# Patient Record
Sex: MALE | Race: WHITE | NOT HISPANIC OR LATINO | URBAN - METROPOLITAN AREA
[De-identification: names, ages, dates, MRNs, and addresses within clinical notes are randomized per-mention and may not be internally consistent; named-entity substitution may affect disease eponyms.]

---

## 2018-06-06 ENCOUNTER — OFFICE VISIT (OUTPATIENT)
Dept: URBAN - METROPOLITAN AREA CLINIC 193 | Facility: CLINIC | Age: 73
End: 2018-06-06
Payer: COMMERCIAL

## 2018-06-06 PROCEDURE — 99213 OFFICE O/P EST LOW 20 MIN: CPT | Performed by: OPHTHALMOLOGY

## 2018-06-06 PROCEDURE — 92134 CPTRZ OPH DX IMG PST SGM RTA: CPT | Performed by: OPHTHALMOLOGY

## 2018-06-06 RX ORDER — BROMFENAC 1.03 MG/ML
0.09 % SOLUTION/ DROPS OPHTHALMIC
Qty: 1 | Refills: 1 | Status: INACTIVE
Start: 2018-06-06 | End: 2018-06-20

## 2018-06-06 RX ORDER — PREDNISOLONE ACETATE 10 MG/ML
1 % SUSPENSION/ DROPS OPHTHALMIC
Qty: 1 | Refills: 1 | Status: INACTIVE
Start: 2018-06-06 | End: 2019-08-22

## 2018-06-06 ASSESSMENT — INTRAOCULAR PRESSURE
OS: 14
OD: 13

## 2018-06-06 NOTE — IMPRESSION/PLAN
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 OS.
Mac-OCT OD stable, OS showing macular thicking Plan: Discussed diagnosis in detail with patient. recommend starting Pred Forte BID OS, Bromfenac BID OS. if Bromfenac not covered, will need to switch to covered drop.

## 2018-06-20 ENCOUNTER — OFFICE VISIT (OUTPATIENT)
Dept: URBAN - METROPOLITAN AREA CLINIC 193 | Facility: CLINIC | Age: 73
End: 2018-06-20
Payer: COMMERCIAL

## 2018-06-20 PROCEDURE — 99024 POSTOP FOLLOW-UP VISIT: CPT | Performed by: OPHTHALMOLOGY

## 2018-06-20 RX ORDER — KETOROLAC TROMETHAMINE 5 MG/ML
0.5 % SOLUTION OPHTHALMIC
Qty: 1 | Refills: 1 | Status: INACTIVE
Start: 2018-06-20 | End: 2019-08-22

## 2018-06-20 ASSESSMENT — INTRAOCULAR PRESSURE
OD: 13
OS: 13

## 2018-06-20 NOTE — IMPRESSION/PLAN
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 OS.
Mac-OCT- OD stable, OS slight improvement Plan: Continue Pred Forte BID OS, Ketorolac BID OS. recommend patient get copy of MRx for [facility] and see Dr Pastor Leija for MRx and compare.

## 2018-07-02 ENCOUNTER — TESTING ONLY (OUTPATIENT)
Dept: URBAN - METROPOLITAN AREA CLINIC 193 | Facility: CLINIC | Age: 73
End: 2018-07-02
Payer: COMMERCIAL

## 2018-07-02 DIAGNOSIS — H52.223 REGULAR ASTIGMATISM, BILATERAL: Primary | ICD-10-CM

## 2018-07-02 DIAGNOSIS — H59.032 CYSTOID MACULAR EDEMA FOLLOWING CATARACT SURGERY OF LEFT EYE: ICD-10-CM

## 2018-07-02 ASSESSMENT — INTRAOCULAR PRESSURE
OD: 14
OS: 17

## 2018-07-02 ASSESSMENT — VISUAL ACUITY
OD: 20/25-
OS: 20/30+

## 2018-07-02 NOTE — IMPRESSION/PLAN
Impression: Regular astigmatism, bilateral: H52.223. significant change OS compared to Rx from [facility] Plan: A glasses prescription has been discussed and generated. Patient to call with any concerns.

## 2018-07-02 NOTE — IMPRESSION/PLAN
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 OS.
Mac-OCT- OD stable, OS slight improvement Plan: Stop Pred Forte BID OS due to possible steroid response. Continue Ketorolac QID OS.

## 2018-10-05 ENCOUNTER — OFFICE VISIT (OUTPATIENT)
Dept: URBAN - METROPOLITAN AREA CLINIC 193 | Facility: CLINIC | Age: 73
End: 2018-10-05
Payer: COMMERCIAL

## 2018-10-05 DIAGNOSIS — H04.123 DRY EYE SYNDROME OF BILATERAL LACRIMAL GLANDS: ICD-10-CM

## 2018-10-05 DIAGNOSIS — Z96.1 PRESENCE OF INTRAOCULAR LENS: ICD-10-CM

## 2018-10-05 DIAGNOSIS — E11.9 TYPE 2 DIABETES MELLITUS W/O COMPLICATION: ICD-10-CM

## 2018-10-05 PROCEDURE — 92014 COMPRE OPH EXAM EST PT 1/>: CPT | Performed by: OPTOMETRIST

## 2018-10-05 PROCEDURE — 92134 CPTRZ OPH DX IMG PST SGM RTA: CPT | Performed by: OPTOMETRIST

## 2018-10-05 ASSESSMENT — INTRAOCULAR PRESSURE
OS: 13
OD: 13

## 2018-10-05 NOTE — IMPRESSION/PLAN
Impression: Type 2 diabetes mellitus w/o complication: N15.6. Plan: DM: No diabetic retinopathy. Discussed ocular and systemic benefits of blood sugar control. Patient was instructed to monitor vision for changes and to call if noted.

## 2018-10-05 NOTE — IMPRESSION/PLAN
Impression: Regular astigmatism, bilateral: H52.223. Plan: A glasses prescription has been discussed and generated. Patient to call with any concerns. OS similar to 7/2018 SRx.

## 2018-10-05 NOTE — IMPRESSION/PLAN
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 OS.
Mac-OCT- OD stable, OS still slight improvement. Plan: no further treatment needed. Continue to monitor.

## 2018-10-05 NOTE — IMPRESSION/PLAN
Impression: Dry eye syndrome of bilateral lacrimal glands: H04.123. Plan: Continue ATs. Use before symptomatic.

## 2019-08-22 ENCOUNTER — OFFICE VISIT (OUTPATIENT)
Dept: URBAN - METROPOLITAN AREA CLINIC 71 | Facility: CLINIC | Age: 74
End: 2019-08-22
Payer: COMMERCIAL

## 2019-08-22 PROCEDURE — 92014 COMPRE OPH EXAM EST PT 1/>: CPT | Performed by: OPTOMETRIST

## 2019-08-22 ASSESSMENT — VISUAL ACUITY
OD: 20/40
OS: 20/30+

## 2019-08-22 ASSESSMENT — INTRAOCULAR PRESSURE
OS: 12
OD: 10

## 2019-08-22 NOTE — IMPRESSION/PLAN
Impression: Type 2 diabetes mellitus w/o complication: A67.0. Plan: DM: No diabetic retinopathy. Discussed ocular and systemic benefits of blood sugar control. Patient was instructed to monitor vision for changes and to call if noted.

## 2019-08-22 NOTE — IMPRESSION/PLAN
Impression: Dry eye syndrome of bilateral lacrimal glands: H04.123. occasional use of AT's Plan: Continue ATs up to qid OU. Use before symptomatic.

## 2019-08-22 NOTE — IMPRESSION/PLAN
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 pt reports that after pt went through broken leg and extensive medicinal tx at his age, doctor told him VF was effected Plan: Discussed. Return for VF 30-2 with review next available.

## 2019-08-29 ENCOUNTER — OFFICE VISIT (OUTPATIENT)
Dept: URBAN - METROPOLITAN AREA CLINIC 71 | Facility: CLINIC | Age: 74
End: 2019-08-29
Payer: COMMERCIAL

## 2019-08-29 DIAGNOSIS — H53.40 UNSPECIFIED VISUAL FIELD DEFECTS: Primary | ICD-10-CM

## 2019-08-29 PROCEDURE — 99212 OFFICE O/P EST SF 10 MIN: CPT | Performed by: OPTOMETRIST

## 2019-08-29 PROCEDURE — 92083 EXTENDED VISUAL FIELD XM: CPT | Performed by: OPTOMETRIST

## 2019-08-29 NOTE — IMPRESSION/PLAN
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 pt reports that after pt went through broken leg and extensive medicinal tx at his age, doctor told him VF was effected. VF 30-2 8/29/2019: WNL OU. Dynamic Side vision: 80 degrees OD and OS. Plan: Discussed. No restrictions advised.  1 yr DE